# Patient Record
Sex: FEMALE | Race: WHITE
[De-identification: names, ages, dates, MRNs, and addresses within clinical notes are randomized per-mention and may not be internally consistent; named-entity substitution may affect disease eponyms.]

---

## 2018-04-25 ENCOUNTER — HOSPITAL ENCOUNTER (OUTPATIENT)
Dept: HOSPITAL 17 - ESDC | Age: 68
Discharge: HOME | End: 2018-04-25
Attending: OPHTHALMOLOGY
Payer: COMMERCIAL

## 2018-04-25 DIAGNOSIS — H43.821: Primary | ICD-10-CM

## 2018-04-25 DIAGNOSIS — H35.371: ICD-10-CM

## 2018-04-25 PROCEDURE — 67042 VIT FOR MACULAR HOLE: CPT

## 2018-04-25 PROCEDURE — 00145 ANES PX EYE VITREORTNL SURG: CPT

## 2018-05-06 NOTE — MP
cc:

Callum Montoya MD

****

 

 

DATE OF OPERATION:

04/25/2018

 

PREOPERATIVE DIAGNOSIS:

Vitreomacular traction, epiretinal membrane, right eye.

 

POSTOPERATIVE DIAGNOSIS:

Vitreomacular traction, epiretinal membrane, right eye.

 

PROCEDURE PERFORMED:

Pars plana vitrectomy with membrane peeling, gas fluid exchange, all 

right eye.

 

ANESTHESIA:

MAC.

 

SURGEON:

Callum Montoya MD

 

COMPLICATIONS:

None.

 

PROCEDURE IN DETAIL:

After informed consent was obtained, the patient was brought to the 

operating room, placed under brief anesthesia with propofol.  10 cc of

50/50 mixture of 0.75% Marcaine and 2% lidocaine was placed in a 

modified [place] lid block as well as peribulbar injection.  The 

patient was then prepared and draped in usual sterile fashion.  A wire

lid speculum was placed in the patient's right eye.  The 23-gauge 

vitrectomy cannulas were then placed in the lower temporal, 

superotemporal and superonasal quadrants 3 mm posterior to the 

corneoscleral limbus.  Infusion cannula was placed lower temporally.  

A core vitrectomy was then performed.  The vitrectomy is carried out 

as far as possible to vitreous base.

 

Attention was then turned to the posterior pole where there was an 

epiretinal membrane covering the surface of the macula.  It was 

carefully peeled off the macula with intraocular forceps.  The same 

was then done for the internal limiting membrane.  Careful indirect 

ophthalmoscopy with scleral depression was then performed and no 

peripheral retinal breaks were noted.  The three vitrectomy cannulas 

were then removed.  Subconjunctival injections of dexamethasone and 

Ancef were placed.  An Atropine drop, Maxitrol ointment and a patch 

shield were then applied.  The patient tolerated the procedure well.  

There were no complications.  She will follow up tomorrow in our 

[place] office.

 

ADDENDUM:

An air air-fluid exchange was performed and 20% SF6 gas was placed.

 

 

__________________________________

Callum Montoya MD

 

 

TAB/KD

D: 05/06/2018, 10:53 AM

T: 05/06/2018, 11:15 AM

Visit #: [id number]

Job #: [id number]